# Patient Record
Sex: MALE | URBAN - METROPOLITAN AREA
[De-identification: names, ages, dates, MRNs, and addresses within clinical notes are randomized per-mention and may not be internally consistent; named-entity substitution may affect disease eponyms.]

---

## 2021-04-02 ENCOUNTER — OFFICE VISIT (OUTPATIENT)
Dept: URBAN - METROPOLITAN AREA CLINIC 71 | Facility: CLINIC | Age: 71
End: 2021-04-02
Payer: COMMERCIAL

## 2021-04-02 DIAGNOSIS — H25.13 AGE-RELATED NUCLEAR CATARACT, BILATERAL: ICD-10-CM

## 2021-04-02 DIAGNOSIS — L21.9 SEBORRHEA: ICD-10-CM

## 2021-04-02 DIAGNOSIS — H02.109 ECTROPION OF EYE: Primary | ICD-10-CM

## 2021-04-02 PROCEDURE — 99202 OFFICE O/P NEW SF 15 MIN: CPT | Performed by: OPHTHALMOLOGY

## 2021-04-02 ASSESSMENT — INTRAOCULAR PRESSURE
OS: 14
OD: 17

## 2021-04-02 NOTE — IMPRESSION/PLAN
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 Ectropion is a pouching outward of the lower eyelid such that the lower lid does not adequately cover the underlying eyeball, which subsequently dries out and gets irritated, causing pain, irritation, foreign body sensation, tearing, redness, and discharge. Plan: Patient to start on an OTC ointment at least at nighttime and using artificial tears OU multiple times daily. Patient to call if symptoms worsen.

## 2021-04-02 NOTE — IMPRESSION/PLAN
Impression: Seborrhea: L21.9 Bilateral. Plan: Recommend patient to see a dermatologist for treatment. Patient is okay to use 1% hydrocortisone cream around the eyes and lids, but instructed to not get it into the eyes.